# Patient Record
Sex: FEMALE | Race: WHITE | NOT HISPANIC OR LATINO
[De-identification: names, ages, dates, MRNs, and addresses within clinical notes are randomized per-mention and may not be internally consistent; named-entity substitution may affect disease eponyms.]

---

## 2022-03-25 PROBLEM — Z00.00 ENCOUNTER FOR PREVENTIVE HEALTH EXAMINATION: Status: ACTIVE | Noted: 2022-03-25

## 2022-03-28 ENCOUNTER — APPOINTMENT (OUTPATIENT)
Dept: RHEUMATOLOGY | Facility: CLINIC | Age: 72
End: 2022-03-28

## 2022-05-11 ENCOUNTER — APPOINTMENT (OUTPATIENT)
Dept: RHEUMATOLOGY | Facility: CLINIC | Age: 72
End: 2022-05-11
Payer: MEDICARE

## 2022-05-11 VITALS
HEART RATE: 107 BPM | DIASTOLIC BLOOD PRESSURE: 78 MMHG | WEIGHT: 124 LBS | OXYGEN SATURATION: 97 % | SYSTOLIC BLOOD PRESSURE: 120 MMHG | HEIGHT: 62 IN | TEMPERATURE: 97.8 F | BODY MASS INDEX: 22.82 KG/M2

## 2022-05-11 DIAGNOSIS — Z87.39 PERSONAL HISTORY OF OTHER DISEASES OF THE MUSCULOSKELETAL SYSTEM AND CONNECTIVE TISSUE: ICD-10-CM

## 2022-05-11 DIAGNOSIS — M25.562 PAIN IN RIGHT KNEE: ICD-10-CM

## 2022-05-11 DIAGNOSIS — Z80.1 FAMILY HISTORY OF MALIGNANT NEOPLASM OF TRACHEA, BRONCHUS AND LUNG: ICD-10-CM

## 2022-05-11 DIAGNOSIS — M25.529 PAIN IN UNSPECIFIED ELBOW: ICD-10-CM

## 2022-05-11 DIAGNOSIS — G89.29 PAIN IN UNSPECIFIED ELBOW: ICD-10-CM

## 2022-05-11 DIAGNOSIS — M79.643 PAIN IN UNSPECIFIED HAND: ICD-10-CM

## 2022-05-11 DIAGNOSIS — M25.561 PAIN IN RIGHT KNEE: ICD-10-CM

## 2022-05-11 DIAGNOSIS — Z78.9 OTHER SPECIFIED HEALTH STATUS: ICD-10-CM

## 2022-05-11 PROCEDURE — 99204 OFFICE O/P NEW MOD 45 MIN: CPT

## 2022-05-11 NOTE — HISTORY OF PRESENT ILLNESS
[FreeTextEntry1] : Around Spring 2020, pt developed pain, stiffness and swelling which initially started in her R elbow but then spread to her shoulders and knees, also with severe b/l biceps pain. She has stiffness when she wakes up for approx 1 hour. The symptoms are also worse with inclement and cold weather. The symptoms have been worsening to the point where, for the past year, she has had difficulty performing ADLs such as cooking, taking public transportation or leaving her house to go anywhere. She saw a rheumatologist Dr. Cheung, reportedly had bloodwork with positive labs for RA although results are not available in the [facility] system. She was started on methotrexate, but feels like it made her symptoms worse. She took the methotrexate for approx 10 months but held it to receive her Covid vaccine, and she felt better when she held the methotrexate so she self-DCed it. \par \par Pt also saw a doctor in [place] (an internist) who prescribed her low-dose naltrexone, which she took January-March 2022 with improvement of her pain and stiffness, but she ran out of it and now her symptoms have worsened again. Also tried Advil and Tylenol with no improvement.\par \par Denies rashes\par \par Physical exam: GEN: AAO woman sitting on exam table in NAD\par SKIN: No rashes\par PULM: Clear to auscultation b/l\par CV: Regular rate and rhythm, no murmurs\par MSK:\par Neck: Full ROM\par Shoulders: Significantly limited active and passive flexion b/l to <90 degrees active, ~120 degrees passive on L and 90 degrees on R\par Elbows: + Effusion in R with crepitus, limited extension to approx 120 degrees, also limited extension to ~160 degrees on L with no appreciable effusion\par Wrists: + ? Effusions but with full ROM and no pain on ROM\par Hands: + MCP effusions R>L with mild ulnar deviation on R, no TTP\par Hips: Full ROM b/l\par Knees: + Mild effusions b/l with bony hypertrophy, crepitus b/l and limited extension to approx 150 degrees b/l\par Ankles: + Soft tissue edema b/l, full ROM b/l\par Feet: + Mild soft tissue edema, no TTP\par EXT: No onycholysis or nail pitting, LE edema to ankles as above

## 2022-05-11 NOTE — ASSESSMENT
[FreeTextEntry1] : Knee/shoulder/elbow pain, stiffness and swelling: Pt has e/o inflammatory arthritis on her exam today in her elbows, hands although she does not have pain or stiffness in the hands, knees, also with significantly limited ROM in the shoulders. She reports having had positive bloodwork for rheumatoid arthritis in the past but the lab results are not available in the [facility] system. She does not have any h/o psoriasis or e/o psoriasis on exam today. \par - f/u RF, CCP, ESR, CRP. Pt had CBC and CMP in 3/2022 which were wnl\par - f/u x-rays of b/l shoulders, elbows, hands, knees\par - Start prednisone 15 mg q day given significant ADL limitations with pt's current symptoms, discussed adverse effects\par - If pt has labs and/or imaging which demonstrate rheumatoid arthritis, would consider trying leflunomide versus a TNF inhibitor as the next step

## 2022-05-11 NOTE — REASON FOR VISIT
[Initial Evaluation] : an initial evaluation [FreeTextEntry1] : Joint pain, stiffness and swelling x 1 year

## 2022-05-18 LAB
CK SERPL-CCNC: 41 U/L
CRP SERPL-MCNC: 5 MG/L
ERYTHROCYTE [SEDIMENTATION RATE] IN BLOOD BY WESTERGREN METHOD: 78 MM/HR
RHEUMATOID FACT SER QL: 218 IU/ML

## 2022-05-19 LAB
CCP AB SER IA-ACNC: >250 UNITS
RF+CCP IGG SER-IMP: ABNORMAL

## 2022-05-20 LAB
ENA SS-A AB SER IA-ACNC: <0.2 AL
ENA SS-B AB SER IA-ACNC: <0.2 AL

## 2022-06-07 ENCOUNTER — APPOINTMENT (OUTPATIENT)
Dept: RHEUMATOLOGY | Facility: CLINIC | Age: 72
End: 2022-06-07
Payer: MEDICARE

## 2022-06-07 VITALS
WEIGHT: 123 LBS | HEART RATE: 71 BPM | BODY MASS INDEX: 22.63 KG/M2 | SYSTOLIC BLOOD PRESSURE: 120 MMHG | TEMPERATURE: 98 F | HEIGHT: 62 IN | DIASTOLIC BLOOD PRESSURE: 78 MMHG | OXYGEN SATURATION: 97 %

## 2022-06-07 PROCEDURE — 99215 OFFICE O/P EST HI 40 MIN: CPT

## 2022-06-07 RX ORDER — PREDNISONE 5 MG/1
5 TABLET ORAL DAILY
Qty: 90 | Refills: 0 | Status: DISCONTINUED | COMMUNITY
Start: 2022-05-11 | End: 2022-06-07

## 2022-06-07 NOTE — HISTORY OF PRESENT ILLNESS
[FreeTextEntry1] : Since her last appointment, pt has been taking the prednisone with significant improvement in her joint pain, to the point where is able to go outside and go shopping, and has improvement in other ADLs. \par \par Previous HPI: Around Spring 2020, pt developed pain, stiffness and swelling which initially started in her R elbow but then spread to her shoulders and knees, also with severe b/l biceps pain. She has stiffness when she wakes up for approx 1 hour. The symptoms are also worse with inclement and cold weather. The symptoms have been worsening to the point where, for the past year, she has had difficulty performing ADLs such as cooking, taking public transportation or leaving her house to go anywhere. She saw a rheumatologist Dr. Cheung, reportedly had bloodwork with positive labs for RA although results are not available in the [facility]. She was started on methotrexate, but feels like it made her symptoms worse. She took the methotrexate for approx 10 months but held it to receive her Covid vaccine, and she felt better when she held the methotrexate so she self-DCed it. \par \par Pt also saw a doctor in [place] (an internist) who prescribed her low-dose naltrexone, which she took January-March 2022 with improvement of her pain and stiffness, but she ran out of it and now her symptoms have worsened again. Also tried Advil and Tylenol with no improvement.\par \par Denies rashes\par \par Physical exam: GEN: AAO woman sitting on exam table in NAD\par SKIN: No rashes\par PULM: Clear to auscultation b/l\par CV: Regular rate and rhythm, no murmurs\par MSK:\par Neck: Full ROM\par Shoulders: Significantly limited active and passive flexion b/l to <90 degrees active, ~120 degrees passive on L and 90 degrees on R\par Elbows: + Effusion in R with crepitus, limited extension to approx 120 degrees, also limited extension to ~160 degrees \par Wrists: + Mild effusions with crepitus and pain on full flexion b/l\par Hands: + MCP effusions R>L with mild ulnar deviation on R, no TTP\par Hips: Full ROM b/l\par Knees: + Mild effusions b/l with bony hypertrophy, crepitus b/l and limited extension to approx 150 degrees b/l\par Ankles: + Soft tissue edema b/l, full ROM b/l\par Feet: + Mild soft tissue edema, no TTP\par EXT: No onycholysis or nail pitting, LE edema to ankles as above

## 2022-06-07 NOTE — ASSESSMENT
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 Pt previously took methotrexate x 10 months with no improvement in her symptoms.\par - Decrease prednisone to 12.5 mg q day\par - Start leflunomide 100 mg q day x 3 days -> 20 mg q day, discussed adverse effects.\par - If leflunomide does not help, would consider either Humira or looking into rituximab or Orencia infusions as pt would prefer not to take home injections, although there may be an issue with insurance authorizing rituximab or Orencia without trying a TNF inhibitor\par - f/u baseline CBC and CMP, also quantiferon and hepatitis B titers \par - Referred to physical therapy for shoulders and knees\par - Discussed supplements and diets that have been shown to have benefit in RA

## 2022-06-08 ENCOUNTER — TRANSCRIPTION ENCOUNTER (OUTPATIENT)
Age: 72
End: 2022-06-08

## 2022-06-21 ENCOUNTER — LABORATORY RESULT (OUTPATIENT)
Age: 72
End: 2022-06-21

## 2022-06-29 ENCOUNTER — APPOINTMENT (OUTPATIENT)
Dept: RHEUMATOLOGY | Facility: CLINIC | Age: 72
End: 2022-06-29

## 2022-06-29 VITALS
HEIGHT: 62 IN | WEIGHT: 123 LBS | DIASTOLIC BLOOD PRESSURE: 64 MMHG | OXYGEN SATURATION: 97 % | TEMPERATURE: 98 F | BODY MASS INDEX: 22.63 KG/M2 | HEART RATE: 97 BPM | SYSTOLIC BLOOD PRESSURE: 120 MMHG

## 2022-06-29 LAB
ALBUMIN SERPL ELPH-MCNC: 4.6 G/DL
ALP BLD-CCNC: 97 U/L
ALT SERPL-CCNC: 16 U/L
ANION GAP SERPL CALC-SCNC: 13 MMOL/L
AST SERPL-CCNC: 19 U/L
BASOPHILS # BLD AUTO: 0.03 K/UL
BASOPHILS NFR BLD AUTO: 0.2 %
BILIRUB SERPL-MCNC: 0.4 MG/DL
BUN SERPL-MCNC: 27 MG/DL
CALCIUM SERPL-MCNC: 10.4 MG/DL
CHLORIDE SERPL-SCNC: 100 MMOL/L
CO2 SERPL-SCNC: 25 MMOL/L
CREAT SERPL-MCNC: 0.8 MG/DL
EGFR: 78 ML/MIN/1.73M2
EOSINOPHIL # BLD AUTO: 0 K/UL
EOSINOPHIL NFR BLD AUTO: 0 %
GLUCOSE SERPL-MCNC: 105 MG/DL
HBV CORE IGG+IGM SER QL: NONREACTIVE
HBV SURFACE AG SER QL: NONREACTIVE
HCT VFR BLD CALC: 39.4 %
HCV AB SER QL: NONREACTIVE
HCV S/CO RATIO: 0.18 S/CO
HGB BLD-MCNC: 12 G/DL
IMM GRANULOCYTES NFR BLD AUTO: 0.7 %
LYMPHOCYTES # BLD AUTO: 2.31 K/UL
LYMPHOCYTES NFR BLD AUTO: 12.3 %
MAN DIFF?: NORMAL
MCHC RBC-ENTMCNC: 26.4 PG
MCHC RBC-ENTMCNC: 30.5 G/DL
MCV RBC AUTO: 86.6 FL
MONOCYTES # BLD AUTO: 0.38 K/UL
MONOCYTES NFR BLD AUTO: 2 %
NEUTROPHILS # BLD AUTO: 15.92 K/UL
NEUTROPHILS NFR BLD AUTO: 84.8 %
PLATELET # BLD AUTO: 464 K/UL
POTASSIUM SERPL-SCNC: 5.1 MMOL/L
PROT SERPL-MCNC: 8.1 G/DL
RBC # BLD: 4.55 M/UL
RBC # FLD: 21.1 %
SODIUM SERPL-SCNC: 138 MMOL/L
WBC # FLD AUTO: 18.78 K/UL

## 2022-06-29 PROCEDURE — 99214 OFFICE O/P EST MOD 30 MIN: CPT

## 2022-06-29 NOTE — ASSESSMENT
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 Pt previously took methotrexate x 10 months with no improvement in her symptoms. Pt is now reluctant to start immunosuppressive therapy for RA.\par - Decrease prednisone to 10 mg q day\par - Discussed with pt that immunosuppressive therapy would help to further prevent damage to pt's joints. She said she will have to think about it and get back to me about when she is ready to start leflunomide.\par - If leflunomide does not help, would consider either Humira or looking into rituximab or Orencia infusions as pt would prefer not to take home injections, although there may be an issue with insurance authorizing rituximab or Orencia without trying a TNF inhibitor\par - Continue PT for knees\par - Prescribed low-dose naltrexone (pt was previously taking 2.25 mg daily) for symptoms as this helped with pt's joint pain and stiffness in the past

## 2022-06-29 NOTE — HISTORY OF PRESENT ILLNESS
[FreeTextEntry1] : Pt did not start the leflunomide after her last appointment, as she is concerned that it would decrease her immune system. She feels like the prednisone continues to help her, she is able to walk better and has more mobility, although she still has trouble lifting her arms over her head. Going for PT for her knees which is also helping. \par \par Previous HPI: Around Spring 2020, pt developed pain, stiffness and swelling which initially started in her R elbow but then spread to her shoulders and knees, also with severe b/l biceps pain. She has stiffness when she wakes up for approx 1 hour. The symptoms are also worse with inclement and cold weather. The symptoms have been worsening to the point where, for the past year, she has had difficulty performing ADLs such as cooking, taking public transportation or leaving her house to go anywhere. She saw a rheumatologist Dr. Cheung, reportedly had bloodwork with positive labs for RA although results are not available in the [facility] system. She was started on methotrexate, but feels like it made her symptoms worse. She took the methotrexate for approx 10 months but held it to receive her Covid vaccine, and she felt better when she held the methotrexate so she self-DCed it. \par \par Pt also saw a doctor in [place] (an internist) who prescribed her low-dose naltrexone, which she took January-March 2022 with improvement of her pain and stiffness, but she ran out of it and now her symptoms have worsened again. Also tried Advil and Tylenol with no improvement.\par \par Denies rashes.\par \par Physical exam: GEN: AAO woman sitting on exam table in NAD\par SKIN: No rashes\par PULM: Clear to auscultation b/l\par CV: Regular rate and rhythm, no murmurs\par MSK:\par Neck: Full ROM\par Shoulders: Significantly limited active and passive flexion b/l to <90 degrees active, ~120 degrees passive on L and 90 degrees on R\par Elbows: + Effusion in R with crepitus, limited extension to approx 120 degrees, also limited extension to ~160 degrees \par Wrists: + Mild effusions with crepitus and pain on full flexion b/l\par Hands: + MCP effusions R>L with mild ulnar deviation on R, no TTP\par Hips: Full ROM b/l\par Knees: + Mild effusions b/l with bony hypertrophy, crepitus b/l and limited extension to approx 150 degrees b/l\par Ankles: + Soft tissue edema b/l, full ROM b/l\par Feet: + Mild soft tissue edema, no TTP\par EXT: No onycholysis or nail pitting, LE edema to ankles as above

## 2022-07-20 ENCOUNTER — RX RENEWAL (OUTPATIENT)
Age: 72
End: 2022-07-20

## 2022-08-04 ENCOUNTER — APPOINTMENT (OUTPATIENT)
Dept: RHEUMATOLOGY | Facility: CLINIC | Age: 72
End: 2022-08-04

## 2022-08-17 ENCOUNTER — APPOINTMENT (OUTPATIENT)
Dept: RHEUMATOLOGY | Facility: CLINIC | Age: 72
End: 2022-08-17

## 2022-08-17 VITALS
WEIGHT: 121 LBS | HEIGHT: 62 IN | TEMPERATURE: 97.7 F | HEART RATE: 81 BPM | BODY MASS INDEX: 22.26 KG/M2 | DIASTOLIC BLOOD PRESSURE: 84 MMHG | OXYGEN SATURATION: 98 % | SYSTOLIC BLOOD PRESSURE: 140 MMHG

## 2022-08-17 PROCEDURE — 99215 OFFICE O/P EST HI 40 MIN: CPT

## 2022-08-17 RX ORDER — ACETAMINOPHEN 325 MG/1
325 TABLET, FILM COATED ORAL
Refills: 0 | Status: ACTIVE | COMMUNITY

## 2022-08-17 RX ORDER — LEFLUNOMIDE 20 MG/1
20 TABLET, FILM COATED ORAL
Qty: 30 | Refills: 1 | Status: DISCONTINUED | COMMUNITY
Start: 2022-06-07 | End: 2022-08-17

## 2022-08-17 NOTE — HISTORY OF PRESENT ILLNESS
[FreeTextEntry1] : Pt continues to take low-dose naltrexone but has not started any targeted therapy for the RA because she is concerned about taking a medication indefinitely and the side effects of this. Her joints have generally been feeling better as far pain is concerned, except for a severe pain with black-and-blue on her R lateral shin 10 days ago which has slowly been improving but has not fully resolved. The pain in her arms is better, and she is able to lie in bed more comfortably. Generally, her symptoms are worse with inclement weather.\par \par Previous HPI: Around Spring 2020, pt developed pain, stiffness and swelling which initially started in her R elbow but then spread to her shoulders and knees, also with severe b/l biceps pain. She has stiffness when she wakes up for approx 1 hour. The symptoms are also worse with inclement and cold weather. The symptoms have been worsening to the point where, for the past year, she has had difficulty performing ADLs such as cooking, taking public transportation or leaving her house to go anywhere. She saw a rheumatologist Dr. Cheung, reportedly had bloodwork with positive labs for RA although results are not available in the [facility] system. She was started on methotrexate, but feels like it made her symptoms worse. She took the methotrexate for approx 10 months but held it to receive her Covid vaccine, and she felt better when she held the methotrexate so she self-DCed it. \par \par Pt also saw a doctor in [place] (an internist) who prescribed her low-dose naltrexone, which she took January-March 2022 with improvement of her pain and stiffness, but she ran out of it and now her symptoms have worsened again. Also tried Advil and Tylenol with no improvement.\par \par Denies rashes.\par \par Physical exam: GEN: AAO woman sitting on exam table in NAD\par SKIN: No rashes\par PULM: Clear to auscultation b/l\par CV: Regular rate and rhythm, no murmurs\par MSK:\par Neck: Full ROM\par Shoulders: Significantly limited active and passive flexion b/l to <90 degrees active, ~120 degrees passive on L and 90 degrees on R\par Elbows: + Effusion in R with crepitus, limited extension to approx 120 degrees, also limited extension to ~160 degrees , + crepitus on L with mildly limited extension to 170 degrees\par Wrists: + effusions with crepitus and pain on full flexion b/l, + warmth b/l\par Hands: + MCP effusions R>L with mild ulnar deviation on R, no TTP\par Hips: Full ROM b/l\par Knees: + Mild effusions b/l with bony hypertrophy, crepitus b/l and limited extension to approx 150 degrees b/l\par Ankles: + Soft tissue edema b/l, full ROM b/l\par Feet: + Mild soft tissue edema, no TTP\par EXT: No onycholysis or nail pitting, LE edema to ankles as above

## 2022-08-17 NOTE — ASSESSMENT
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 Pt previously took methotrexate x 10 months with no improvement in her symptoms. Pt is now reluctant to start immunosuppressive therapy for RA.\par - Decrease prednisone to 7.5 mg q day\par - Discussed with pt that immunosuppressive therapy would help to further prevent damage to pt's joints. She said she will have to think about it and get back to me. Today we discussed IV Orencia, versus Actemra versus rituximab. She is interested in Orencia but would like to do more research.\par - Continue low-dose naltrexone 2.25 mg q day

## 2022-09-19 ENCOUNTER — APPOINTMENT (OUTPATIENT)
Dept: RHEUMATOLOGY | Facility: CLINIC | Age: 72
End: 2022-09-19

## 2022-09-19 VITALS
OXYGEN SATURATION: 97 % | WEIGHT: 121 LBS | BODY MASS INDEX: 22.26 KG/M2 | DIASTOLIC BLOOD PRESSURE: 72 MMHG | HEIGHT: 62 IN | HEART RATE: 99 BPM | SYSTOLIC BLOOD PRESSURE: 116 MMHG

## 2022-09-19 PROCEDURE — 99213 OFFICE O/P EST LOW 20 MIN: CPT

## 2022-09-19 RX ORDER — NALTREXONE HCL 100 %
POWDER (GRAM) MISCELLANEOUS DAILY
Qty: 203 | Refills: 0 | Status: DISCONTINUED | COMMUNITY
Start: 2022-06-29 | End: 2022-09-19

## 2022-09-19 RX ORDER — PREDNISONE 2.5 MG/1
2.5 TABLET ORAL DAILY
Qty: 30 | Refills: 0 | Status: DISCONTINUED | COMMUNITY
Start: 2022-06-07 | End: 2022-09-19

## 2022-09-19 RX ORDER — NALTREXONE HCL 100 %
POWDER (GRAM) MISCELLANEOUS
Qty: 0.14 | Refills: 3 | Status: ACTIVE | COMMUNITY
Start: 2022-09-19 | End: 1900-01-01

## 2022-09-19 NOTE — ASSESSMENT
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 Pt previously took methotrexate x 10 months with no improvement in her symptoms. Pt has been reluctant to initiate any new RA-specific treatments because of concerns about the possible immunosuppression. She is unable to give herself injections at home.\par - Continue prednisone 7.5 mg q day for now\par - Pt is amenable to trying Orencia; ordered IV Orencia infusions 500 mg q months\par - Increase low-dose naltrexone to 4.5 mg q day

## 2022-09-19 NOTE — HISTORY OF PRESENT ILLNESS
[FreeTextEntry1] : Pt reports worsening of her joint symptoms in the setting of decreasing prednisone and also colder weather. She has been using a cane recently because of difficulty navigating the stairs. \par \par Previous HPI: Around Spring 2020, pt developed pain, stiffness and swelling which initially started in her R elbow but then spread to her shoulders and knees, also with severe b/l biceps pain. She has stiffness when she wakes up for approx 1 hour. The symptoms are also worse with inclement and cold weather. The symptoms have been worsening to the point where, for the past year, she has had difficulty performing ADLs such as cooking, taking public transportation or leaving her house to go anywhere. She saw a rheumatologist Dr. Cheung, reportedly had bloodwork with positive labs for RA although results are not available in the [facility] system. She was started on methotrexate, but feels like it made her symptoms worse. She took the methotrexate for approx 10 months but held it to receive her Covid vaccine, and she felt better when she held the methotrexate so she self-DCed it. \par \par Pt also saw a doctor in [place] (an internist) who prescribed her low-dose naltrexone, which she took January-March 2022 with improvement of her pain and stiffness, but she ran out of it and now her symptoms have worsened again. Also tried Advil and Tylenol with no improvement.\par \par Denies rashes.\par \par Physical exam: GEN: AAO woman sitting on exam table in NAD\par SKIN: No rashes\par PULM: Clear to auscultation b/l\par CV: Regular rate and rhythm, no murmurs\par MSK:\par Neck: Full ROM\par Shoulders: Significantly limited active and passive flexion b/l to <90 degrees active, ~120 degrees passive on L and 90 degrees on R\par Elbows: + Effusion in R with crepitus, limited extension to approx 120 degrees, also limited extension to ~160 degrees , + crepitus on L with mildly limited extension to 170 degrees\par Wrists: + effusions with crepitus and pain on full flexion b/l, + warmth b/l\par Hands: + MCP effusions R>L with mild ulnar deviation on R, no TTP\par Hips: Full ROM b/l\par Knees: + Mild effusions b/l with bony hypertrophy, crepitus b/l and limited extension to approx 150 degrees b/l\par Ankles: + Soft tissue edema b/l, full ROM b/l\par Feet: + Mild soft tissue edema, no TTP\par EXT: No onycholysis or nail pitting, LE edema to ankles as above

## 2022-10-26 ENCOUNTER — APPOINTMENT (OUTPATIENT)
Dept: RHEUMATOLOGY | Facility: CLINIC | Age: 72
End: 2022-10-26

## 2022-10-26 PROCEDURE — 99214 OFFICE O/P EST MOD 30 MIN: CPT

## 2022-10-26 NOTE — HISTORY OF PRESENT ILLNESS
[FreeTextEntry1] : Pt has not heard anything about the Orencia infusions yet, although she says she received a letter from her insurance last week that she never opened. Overall, her joints are feeling a little better with the gluten-free diet that she is following and with the prednisone and low-dose naltrexone, although her shoulder pain has worsened recently. Also feels like she has more energy before; she started taking vitamin B12 supplements again after not taking them for a while, so she she is not sure if that helped with her energy.\par \par Previous HPI: Around Spring 2020, pt developed pain, stiffness and swelling which initially started in her R elbow but then spread to her shoulders and knees, also with severe b/l biceps pain. She has stiffness when she wakes up for approx 1 hour. The symptoms are also worse with inclement and cold weather. The symptoms have been worsening to the point where, for the past year, she has had difficulty performing ADLs such as cooking, taking public transportation or leaving her house to go anywhere. She saw a rheumatologist Dr. Cheung, reportedly had bloodwork with positive labs for RA although results are not available in the [facility]. She was started on methotrexate, but feels like it made her symptoms worse. She took the methotrexate for approx 10 months but held it to receive her Covid vaccine, and she felt better when she held the methotrexate so she self-DCed it. \par \par Pt also saw a doctor in [place] (an internist) who prescribed her low-dose naltrexone, which she took January-March 2022 with improvement of her pain and stiffness, but she ran out of it and now her symptoms have worsened again. Also tried Advil and Tylenol with no improvement.\par \par Denies rashes.\par \par Physical exam: GEN: AAO woman sitting on exam table in NAD\par SKIN: No rashes\par PULM: Clear to auscultation b/l\par CV: Regular rate and rhythm, no murmurs\par MSK:\par Neck: Full ROM\par Shoulders: Significantly limited active and passive flexion b/l to <90 degrees active, ~120 degrees passive on L and 90 degrees on R\par Elbows: + Effusion in R with crepitus, limited extension to approx 120 degrees, also limited extension to ~160 degrees , + crepitus on L with mildly limited extension to 170 degrees\par Wrists: + effusions with crepitus and pain on full flexion b/l, + warmth b/l\par Hands: + MCP effusions R>L with mild ulnar deviation on R, no TTP\par Hips: Full ROM b/l\par Knees: + Mild effusions b/l with bony hypertrophy, crepitus b/l and limited extension to approx 150 degrees b/l\par Ankles: + Soft tissue edema b/l, full ROM b/l\par Feet: + Mild soft tissue edema, no TTP\par EXT: No onycholysis or nail pitting, LE edema to ankles as above

## 2022-10-26 NOTE — ASSESSMENT
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 Pt previously took methotrexate x 10 months with no improvement in her symptoms. Pt has been reluctant to initiate any new RA-specific treatments because of concerns about the possible immunosuppression. She is unable to give herself injections at home.\par - Continue prednisone 7.5 mg q day for now\par - I called pt's insurance and her Orencia infusions were approved in September 2022; auth number [id number]. Start Orencia 500 mg at weeks 0, 2, and 4, then every 4 weeks starting week 8\par - Continue low-dose naltrexone 4.5 mg q day\par \par f/u in 1 month

## 2022-11-01 ENCOUNTER — APPOINTMENT (OUTPATIENT)
Dept: HEMATOLOGY ONCOLOGY | Facility: CLINIC | Age: 72
End: 2022-11-01

## 2022-11-01 ENCOUNTER — APPOINTMENT (OUTPATIENT)
Dept: INFUSION THERAPY | Facility: CLINIC | Age: 72
End: 2022-11-01

## 2022-11-21 ENCOUNTER — RX RENEWAL (OUTPATIENT)
Age: 72
End: 2022-11-21

## 2023-03-07 ENCOUNTER — RX RENEWAL (OUTPATIENT)
Age: 73
End: 2023-03-07

## 2023-03-14 ENCOUNTER — APPOINTMENT (OUTPATIENT)
Dept: RHEUMATOLOGY | Facility: CLINIC | Age: 73
End: 2023-03-14
Payer: MEDICARE

## 2023-03-14 PROCEDURE — 99214 OFFICE O/P EST MOD 30 MIN: CPT | Mod: 95

## 2023-03-14 RX ORDER — NALTREXONE HCL 100 %
POWDER (GRAM) MISCELLANEOUS
Qty: 0.14 | Refills: 11 | Status: ACTIVE | COMMUNITY
Start: 2023-03-14 | End: 1900-01-01

## 2023-03-14 NOTE — HISTORY OF PRESENT ILLNESS
[FreeTextEntry1] : Pt was approved to receive Orencia several months ago, but never went for the infusions, and her arthritis has worsened since that time, with worse pain especially in her knees and shoulders, especially with inclement weather, and difficulty walking. She continues to take prednisone 7.5 mg q day and low-dose naltrexone. Pt is now interested in trying the Orencia infusions, but changed her insurance since it was approved so she would need a new prior authorization.\par \par Prior treatments:\par - Methotrexate: didn't help with symptoms\par - Prednisone: helps somewhat\par - Low-dose naltrexone: helps somewhat\par - Pt is unable to use injections at home so she would have difficulty with TNF inhibitor injections\par \par Previous HPI: Around Spring 2020, pt developed pain, stiffness and swelling which initially started in her R elbow but then spread to her shoulders and knees, also with severe b/l biceps pain. She has stiffness when she wakes up for approx 1 hour. The symptoms are also worse with inclement and cold weather. The symptoms have been worsening to the point where, for the past year, she has had difficulty performing ADLs such as cooking, taking public transportation or leaving her house to go anywhere. She saw a rheumatologist Dr. Cheung, reportedly had bloodwork with positive labs for RA although results are not available in the [facility] system. She was started on methotrexate, but feels like it made her symptoms worse. She took the methotrexate for approx 10 months but held it to receive her Covid vaccine, and she felt better when she held the methotrexate so she self-DCed it. \par \par Pt also saw a doctor in [place] (an internist) who prescribed her low-dose naltrexone, which she took January-March 2022 with improvement of her pain and stiffness, but she ran out of it and now her symptoms have worsened again. Also tried Advil and Tylenol with no improvement.\par \par Denies rashes.

## 2023-03-14 NOTE — ASSESSMENT
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 Pt previously took methotrexate x 10 months with no improvement in her symptoms. Pt has been reluctant to initiate any new RA-specific treatments because of concerns about the possible immunosuppression. She is unable to give herself injections at home.\par - Increase prednisone to 15 mg q day\par - Continue low-dose naltrexone 4.5 mg q day\par - Re-referred pt for Orencia infusions 500 mg IV at weeks 0, 2, and 4, then every 4 weeks starting week 8. Pt had negative quantiferon and hepatiits B testing in June 2022.\par \par f/u in 3 weeks

## 2023-04-04 ENCOUNTER — APPOINTMENT (OUTPATIENT)
Dept: RHEUMATOLOGY | Facility: CLINIC | Age: 73
End: 2023-04-04
Payer: MEDICARE

## 2023-04-04 DIAGNOSIS — M25.519 PAIN IN UNSPECIFIED SHOULDER: ICD-10-CM

## 2023-04-04 PROCEDURE — 99214 OFFICE O/P EST MOD 30 MIN: CPT | Mod: 95

## 2023-04-05 NOTE — HISTORY OF PRESENT ILLNESS
[FreeTextEntry1] : Pt continues to experience severe debilitating pain especially in her shoulders and knees, with limited mobility and now difficulty leaving her house due to the pain and stiffness. She thinks the higher prednisone dose has helped, and says her brother told her that her mobility has improved with 15 mg prednisone, but she continues to experience severe pain which is interfering with her sleep.\par \par Prior treatments:\par - Methotrexate: didn't help with symptoms\par - Prednisone: helps somewhat\par - Low-dose naltrexone: helps somewhat\par - Pt is unable to use injections at home so she would have difficulty with TNF inhibitor injections\par \par Previous HPI: Around Spring 2020, pt developed pain, stiffness and swelling which initially started in her R elbow but then spread to her shoulders and knees, also with severe b/l biceps pain. She has stiffness when she wakes up for approx 1 hour. The symptoms are also worse with inclement and cold weather. The symptoms have been worsening to the point where, for the past year, she has had difficulty performing ADLs such as cooking, taking public transportation or leaving her house to go anywhere. She saw a rheumatologist Dr. Cheung, reportedly had bloodwork with positive labs for RA although results are not available in the [facility] system. She was started on methotrexate, but feels like it made her symptoms worse. She took the methotrexate for approx 10 months but held it to receive her Covid vaccine, and she felt better when she held the methotrexate so she self-DCed it. \par \par Pt also saw a doctor in [place] (an internist) who prescribed her low-dose naltrexone, which she took January-March 2022 with improvement of her pain and stiffness, but she ran out of it and now her symptoms have worsened again. Also tried Advil and Tylenol with no improvement.\par \par Denies rashes.

## 2023-04-05 NOTE — HISTORY OF PRESENT ILLNESS
[FreeTextEntry1] : Pt continues to experience severe debilitating pain especially in her shoulders and knees, with limited mobility and now difficulty leaving her house due to the pain and stiffness. She thinks the higher prednisone dose has helped, and says her brother told her that her mobility has improved with 15 mg prednisone, but she continues to experience severe pain which is interfering with her sleep.\par \par Prior treatments:\par - Methotrexate: didn't help with symptoms\par - Prednisone: helps somewhat\par - Low-dose naltrexone: helps somewhat\par - Pt is unable to use injections at home so she would have difficulty with TNF inhibitor injections\par \par Previous HPI: Around Spring 2020, pt developed pain, stiffness and swelling which initially started in her R elbow but then spread to her shoulders and knees, also with severe b/l biceps pain. She has stiffness when she wakes up for approx 1 hour. The symptoms are also worse with inclement and cold weather. The symptoms have been worsening to the point where, for the past year, she has had difficulty performing ADLs such as cooking, taking public transportation or leaving her house to go anywhere. She saw a rheumatologist Dr. Cheung, reportedly had bloodwork with positive labs for RA although results are not available in the [facility] system. She was started on methotrexate, but feels like it made her symptoms worse. She took the methotrexate for approx 10 months but held it to receive her Covid vaccine, and she felt better when she held the methotrexate so she self-DCed it. \par \par Pt also saw a doctor in [place] (an internist) who prescribed her low-dose naltrexone, which she took January-March 2022 with improvement of her pain and stiffness, but she ran out of it and now her symptoms have worsened again. Also tried Advil and Tylenol with no improvement.\par \par Denies rashes.

## 2023-04-05 NOTE — ASSESSMENT
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 Pt previously took methotrexate x 10 months with no improvement in her symptoms. Pt has been reluctant to initiate any new RA-specific treatments because of concerns about the possible immunosuppression. She is unable to give herself injections at home.\par - Increase prednisone to 20 mg daily\par - Continue low-dose naltrexone 4.5 mg q day\par - Re-referred pt for Orencia infusions 500 mg IV at weeks 0, 2, and 4, then every 4 weeks starting week 8. Pt had negative quantiferon and hepatiits B testing in June 2022. Currently waiting to hear back on copay assistance from the pharmaceutical company\par - Referred for telehealth PT for knees and shoulders\par \par f/u in 3 weeks

## 2023-04-11 ENCOUNTER — NON-APPOINTMENT (OUTPATIENT)
Age: 73
End: 2023-04-11

## 2023-04-11 RX ORDER — TOFACITINIB 11 MG/1
11 TABLET, FILM COATED, EXTENDED RELEASE ORAL
Qty: 90 | Refills: 3 | Status: DISCONTINUED | COMMUNITY
Start: 2023-04-10 | End: 2023-04-11

## 2023-04-19 ENCOUNTER — NON-APPOINTMENT (OUTPATIENT)
Age: 73
End: 2023-04-19

## 2023-05-17 RX ORDER — PREDNISONE 2.5 MG/1
2.5 TABLET ORAL DAILY
Qty: 90 | Refills: 1 | Status: DISCONTINUED | COMMUNITY
Start: 2022-09-19 | End: 2023-05-17

## 2023-05-17 RX ORDER — PREDNISONE 2.5 MG/1
2.5 TABLET ORAL DAILY
Qty: 90 | Refills: 1 | Status: DISCONTINUED | COMMUNITY
Start: 2022-11-11 | End: 2023-05-17

## 2023-05-17 RX ORDER — PREDNISONE 5 MG/1
5 TABLET ORAL DAILY
Qty: 90 | Refills: 1 | Status: DISCONTINUED | COMMUNITY
Start: 2023-03-14 | End: 2023-05-17

## 2023-06-01 RX ORDER — PREDNISONE 2.5 MG/1
2.5 TABLET ORAL DAILY
Qty: 30 | Refills: 1 | Status: DISCONTINUED | COMMUNITY
Start: 2022-08-17 | End: 2023-06-01

## 2023-06-01 RX ORDER — PREDNISONE 2.5 MG/1
2.5 TABLET ORAL DAILY
Qty: 360 | Refills: 1 | Status: DISCONTINUED | COMMUNITY
Start: 2023-05-17 | End: 2023-06-01

## 2023-07-27 DIAGNOSIS — M06.9 RHEUMATOID ARTHRITIS, UNSPECIFIED: ICD-10-CM

## 2023-07-27 RX ORDER — AMOXICILLIN 875 MG/1
875 TABLET, FILM COATED ORAL
Qty: 10 | Refills: 0 | Status: ACTIVE | COMMUNITY
Start: 2023-07-27 | End: 1900-01-01
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
## 2023-08-01 DIAGNOSIS — L03.90 CELLULITIS, UNSPECIFIED: ICD-10-CM

## 2023-08-01 RX ORDER — AMOXICILLIN 875 MG/1
875 TABLET, FILM COATED ORAL
Qty: 18 | Refills: 0 | Status: ACTIVE | COMMUNITY
Start: 2023-08-01 | End: 1900-01-01

## 2023-08-15 ENCOUNTER — APPOINTMENT (OUTPATIENT)
Dept: RHEUMATOLOGY | Facility: CLINIC | Age: 73
End: 2023-08-15
Payer: MEDICARE

## 2023-08-15 VITALS
DIASTOLIC BLOOD PRESSURE: 90 MMHG | HEIGHT: 62 IN | HEART RATE: 116 BPM | OXYGEN SATURATION: 98 % | SYSTOLIC BLOOD PRESSURE: 140 MMHG | WEIGHT: 123 LBS | BODY MASS INDEX: 22.63 KG/M2

## 2023-08-15 DIAGNOSIS — M05.732 RHEUMATOID ARTHRITIS WITH RHEUMATOID FACTOR OF RIGHT WRIST W/OUT ORGAN OR SYSTEMS INVOLVEMENT: ICD-10-CM

## 2023-08-15 DIAGNOSIS — M05.731 RHEUMATOID ARTHRITIS WITH RHEUMATOID FACTOR OF RIGHT WRIST W/OUT ORGAN OR SYSTEMS INVOLVEMENT: ICD-10-CM

## 2023-08-15 PROCEDURE — 99215 OFFICE O/P EST HI 40 MIN: CPT

## 2023-08-15 RX ORDER — PREDNISONE 5 MG/1
5 TABLET ORAL
Qty: 120 | Refills: 1 | Status: DISCONTINUED | COMMUNITY
Start: 2023-06-01 | End: 2023-08-15

## 2023-08-15 NOTE — HISTORY OF PRESENT ILLNESS
[FreeTextEntry1] : Pt notes improvement in her mobility and overall pain with the Humira, which she has taken for 3 months so far. She is still experiencing significant b/l knee pain, also still having pain in her shoulders and R elbow. She also noticed delayed wound healing on the Humira, and a recent L leg cellulitis after her cat scratches her.   Prior treatments: - Methotrexate: didn't help with symptoms - Prednisone: helps somewhat - Low-dose naltrexone: helps somewhat - Pt is unable to use injections at home so she would have difficulty with TNF inhibitor injections  Previous HPI: Around Spring 2020, pt developed pain, stiffness and swelling which initially started in her R elbow but then spread to her shoulders and knees, also with severe b/l biceps pain. She has stiffness when she wakes up for approx 1 hour. The symptoms are also worse with inclement and cold weather. The symptoms have been worsening to the point where, for the past year, she has had difficulty performing ADLs such as cooking, taking public transportation or leaving her house to go anywhere. She saw a rheumatologist Dr. Cheung, reportedly had bloodwork with positive labs for RA although results are not available in the [facility] system. She was started on methotrexate, but feels like it made her symptoms worse. She took the methotrexate for approx 10 months but held it to receive her Covid vaccine, and she felt better when she held the methotrexate so she self-DCed it.   Pt also saw a doctor in [place] (an internist) who prescribed her low-dose naltrexone, which she took January-March 2022 with improvement of her pain and stiffness, but she ran out of it and now her symptoms have worsened again. Also tried Advil and Tylenol with no improvement.  Denies rashes.  Physical exam: GEN: AAO woman sitting on exam table in NAD SKIN: No rashes PULM: Clear to auscultation b/l CV: + Tachycardia MSK: Neck: Full ROM Shoulders: Interval improvement in b/l shoulder abduction to approx 120 degrees b/l Elbows: + Effusion in R with crepitus, limited extension to approx 120 degrees, also limited extension to ~160 degrees , + crepitus on L with mildly limited extension to 170 degrees, no pain with ROM Wrists: Interval improvement of b/l effusions and warmth, + crepitus with ROM Hands: Interval improvement in R>L MCP effusions, mild ulnar deviation on R, no TTP Hips: Full ROM b/l Knees: Interval improvement of b/l effusions, with bony hypertrophy, crepitus b/l and limited extension to approx 150 degrees b/l Ankles: + Soft tissue edema on L, full ROM b/l Feet: No edema, no TTP EXT: No onycholysis or nail pitting, + mild LE edema on L

## 2023-08-15 NOTE — ASSESSMENT
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 Pt previously took methotrexate x 10 months with no improvement in her symptoms. Pt had previously been reluctant to initiate any new RA-specific treatments because of concerns about the possible immunosuppression. Now on Humira since 5/2023 with improvement in pain and mobility but still with considerable knee and shoulder symptoms; also with improved exam today. - Decrease prednisone to 15 mg q day x 1 month, then to 10 mg q day - Continue Humira 40 mg q 14 days - f/u repeat hep B titers and quantiferon, last checked 6/2022 - Continue low-dose naltrexone 4.5 mg q day - Re-referred to PT for knees  f/u in 3 months

## 2023-08-18 LAB
ALBUMIN SERPL ELPH-MCNC: 4.5 G/DL
ALP BLD-CCNC: 72 U/L
ALT SERPL-CCNC: 26 U/L
ANION GAP SERPL CALC-SCNC: 15 MMOL/L
AST SERPL-CCNC: 19 U/L
BILIRUB SERPL-MCNC: 1 MG/DL
BUN SERPL-MCNC: 16 MG/DL
CALCIUM SERPL-MCNC: 10.3 MG/DL
CHLORIDE SERPL-SCNC: 100 MMOL/L
CO2 SERPL-SCNC: 24 MMOL/L
CREAT SERPL-MCNC: 0.6 MG/DL
CRP SERPL-MCNC: <3 MG/L
EGFR: 95 ML/MIN/1.73M2
ERYTHROCYTE [SEDIMENTATION RATE] IN BLOOD BY WESTERGREN METHOD: 48 MM/HR
GLUCOSE SERPL-MCNC: 150 MG/DL
HBV CORE IGG+IGM SER QL: NONREACTIVE
HBV SURFACE AB SER QL: NONREACTIVE
HBV SURFACE AG SER QL: NONREACTIVE
HCV AB SER QL: NONREACTIVE
HCV S/CO RATIO: 0.29 S/CO
M TB IFN-G BLD-IMP: NEGATIVE
POTASSIUM SERPL-SCNC: 4.6 MMOL/L
PROT SERPL-MCNC: 7.7 G/DL
QUANTIFERON TB PLUS MITOGEN MINUS NIL: 0.91 IU/ML
QUANTIFERON TB PLUS NIL: 0.01 IU/ML
QUANTIFERON TB PLUS TB1 MINUS NIL: 0 IU/ML
QUANTIFERON TB PLUS TB2 MINUS NIL: 0 IU/ML
SODIUM SERPL-SCNC: 139 MMOL/L

## 2023-11-13 ENCOUNTER — RX RENEWAL (OUTPATIENT)
Age: 73
End: 2023-11-13

## 2023-11-14 ENCOUNTER — APPOINTMENT (OUTPATIENT)
Dept: RHEUMATOLOGY | Facility: CLINIC | Age: 73
End: 2023-11-14

## 2023-12-19 RX ORDER — ADALIMUMAB 40MG/0.4ML
40 KIT SUBCUTANEOUS
Qty: 1 | Refills: 5 | Status: ACTIVE | COMMUNITY
Start: 2023-04-11

## 2024-03-28 RX ORDER — PREDNISONE 5 MG/1
5 TABLET ORAL
Qty: 60 | Refills: 1 | Status: ACTIVE | COMMUNITY
Start: 2024-03-28 | End: 1900-01-01

## 2024-03-28 RX ORDER — PREDNISONE 5 MG/1
5 TABLET ORAL
Qty: 150 | Refills: 0 | Status: DISCONTINUED | COMMUNITY
Start: 2023-08-15 | End: 2024-03-28

## 2024-07-11 ENCOUNTER — RX RENEWAL (OUTPATIENT)
Age: 74
End: 2024-07-11